# Patient Record
Sex: MALE | Race: WHITE
[De-identification: names, ages, dates, MRNs, and addresses within clinical notes are randomized per-mention and may not be internally consistent; named-entity substitution may affect disease eponyms.]

---

## 2021-08-14 ENCOUNTER — HOSPITAL ENCOUNTER (EMERGENCY)
Dept: HOSPITAL 49 - FER | Age: 76
Discharge: HOME | End: 2021-08-14
Payer: COMMERCIAL

## 2021-08-14 DIAGNOSIS — I10: ICD-10-CM

## 2021-08-14 DIAGNOSIS — I83.891: Primary | ICD-10-CM

## 2021-08-14 DIAGNOSIS — Z87.891: ICD-10-CM

## 2021-12-07 ENCOUNTER — HOSPITAL ENCOUNTER (INPATIENT)
Dept: HOSPITAL 49 - FER | Age: 76
LOS: 1 days | Discharge: OTHER INSTITUTION | DRG: 281 | End: 2021-12-08
Attending: INTERNAL MEDICINE | Admitting: INTERNAL MEDICINE
Payer: COMMERCIAL

## 2021-12-07 VITALS — BODY MASS INDEX: 34.42 KG/M2 | WEIGHT: 251.33 LBS | HEIGHT: 71.5 IN

## 2021-12-07 DIAGNOSIS — Z87.891: ICD-10-CM

## 2021-12-07 DIAGNOSIS — S60.212A: ICD-10-CM

## 2021-12-07 DIAGNOSIS — Z78.1: ICD-10-CM

## 2021-12-07 DIAGNOSIS — E11.65: ICD-10-CM

## 2021-12-07 DIAGNOSIS — I49.5: ICD-10-CM

## 2021-12-07 DIAGNOSIS — I10: ICD-10-CM

## 2021-12-07 DIAGNOSIS — I21.A1: ICD-10-CM

## 2021-12-07 DIAGNOSIS — Z79.01: ICD-10-CM

## 2021-12-07 DIAGNOSIS — E78.5: ICD-10-CM

## 2021-12-07 DIAGNOSIS — Z86.73: ICD-10-CM

## 2021-12-07 DIAGNOSIS — E66.9: ICD-10-CM

## 2021-12-07 DIAGNOSIS — Z79.899: ICD-10-CM

## 2021-12-07 DIAGNOSIS — S60.211A: ICD-10-CM

## 2021-12-07 DIAGNOSIS — I16.1: Primary | ICD-10-CM

## 2021-12-07 DIAGNOSIS — M54.50: ICD-10-CM

## 2021-12-07 DIAGNOSIS — Z95.5: ICD-10-CM

## 2021-12-07 DIAGNOSIS — Z20.822: ICD-10-CM

## 2021-12-07 DIAGNOSIS — Z98.890: ICD-10-CM

## 2021-12-07 DIAGNOSIS — Z79.82: ICD-10-CM

## 2021-12-07 DIAGNOSIS — I25.10: ICD-10-CM

## 2021-12-07 DIAGNOSIS — Z91.14: ICD-10-CM

## 2021-12-07 DIAGNOSIS — I67.4: ICD-10-CM

## 2021-12-07 LAB
ALBUMIN SERPL-MCNC: 3.9 G/DL (ref 3.4–5)
ALKALINE PHOSHATASE: 88 U/L (ref 46–116)
ALT SERPL-CCNC: 42 U/L (ref 16–63)
AST: 24 U/L (ref 15–37)
BACTERIA: (no result)
BASOPHIL: 0.5 % (ref 0–2)
BILIRUBIN - TOTAL: 0.4 MG/DL (ref 0.2–1)
BILIRUBIN: NEGATIVE MG/DL
BLOOD: (no result) ERY/UL
BUN SERPL-MCNC: 9 MG/DL (ref 7–18)
BUN/CREAT RATIO (CALC): 12.5 RATIO
CHLORIDE: 103 MMOL/L (ref 98–107)
CK MB SERPL-MCNC: 5.8 NG/ML (ref 0–3.6)
CLARITY UR: CLEAR
CO2 (BICARBONATE): 18 MMOL/L (ref 21–32)
COLOR: YELLOW
CORONAVIRUS 2019 SARS-COV-2: NEGATIVE
CREATININE: 0.72 MG/DL (ref 0.67–1.17)
EOSINOPHIL: 0.9 % (ref 0–7)
GLOBULIN (CALCULATION): 3.1 G/DL
GLUCOSE (U): NORMAL MG/DL
GLUCOSE SERPL-MCNC: 213 MG/DL (ref 74–106)
HCT: 42.8 % (ref 42–52)
HGB BLD-MCNC: 14.2 G/DL (ref 13.2–18)
INFLUENZA A NAA: NEGATIVE
LEUKOCYTES: NEGATIVE LEU/UL
LYMPHOCYTE: 16.3 % (ref 15–48)
MCH RBC QN AUTO: 29.4 PG (ref 25–31)
MCHC RBC AUTO-ENTMCNC: 33.2 G/DL (ref 32–36)
MCV: 88.6 FL (ref 78–100)
MONOCYTE: 5.3 % (ref 0–12)
MPV: 10.2 FL (ref 6–9.5)
MUCOUS: (no result)
NEUTROPHIL: 74.9 % (ref 41–80)
NITRITE: NEGATIVE MG/DL
NRBC: 0
PLT: 238 K/UL (ref 150–400)
POTASSIUM: 3.4 MMOL/L (ref 3.5–5.1)
PRO-BNP: 336 PG/ML (ref ?–125)
PROTEIN: (no result) MG/DL
RBC MORPHOLOGY: NORMAL
RBC: 4.83 M/UL (ref 4.7–6)
RDW: 12.7 % (ref 11.5–14)
SPECIFIC GRAVITY: 1.02 (ref 1–1.03)
TOTAL PROTEIN: 7 G/DL (ref 6.4–8.2)
URINARY RBC: (no result)
URINARY WBC: (no result)
UROBILINOGEN: 0.2 MG/DL (ref 0.2–1)
WBC: 14.1 K/UL (ref 4–10.5)

## 2021-12-07 PROCEDURE — G0378 HOSPITAL OBSERVATION PER HR: HCPCS

## 2021-12-07 PROCEDURE — U0002 COVID-19 LAB TEST NON-CDC: HCPCS

## 2021-12-08 LAB
ALBUMIN SERPL-MCNC: 3.1 G/DL (ref 3.4–5)
ALKALINE PHOSHATASE: 69 U/L (ref 46–116)
ALT SERPL-CCNC: 36 U/L (ref 16–63)
AST: 27 U/L (ref 15–37)
BASOPHIL: 0.2 % (ref 0–2)
BILIRUBIN - TOTAL: 0.5 MG/DL (ref 0.2–1)
BUN SERPL-MCNC: 12 MG/DL (ref 7–18)
BUN/CREAT RATIO (CALC): 15.2 RATIO
CHLORIDE: 105 MMOL/L (ref 98–107)
CO2 (BICARBONATE): 26 MMOL/L (ref 21–32)
CREATININE: 0.79 MG/DL (ref 0.67–1.17)
EOSINOPHIL: 0.9 % (ref 0–7)
GLOBULIN (CALCULATION): 2.7 G/DL
GLUCOSE SERPL-MCNC: 130 MG/DL (ref 74–106)
HCT: 35.9 % (ref 42–52)
HGB BLD-MCNC: 11.9 G/DL (ref 13.2–18)
LYMPHOCYTE: 11.2 % (ref 15–48)
MCH RBC QN AUTO: 29.7 PG (ref 25–31)
MCHC RBC AUTO-ENTMCNC: 33.1 G/DL (ref 32–36)
MCV: 89.5 FL (ref 78–100)
MONOCYTE: 9.9 % (ref 0–12)
MPV: 9.8 FL (ref 6–9.5)
NEUTROPHIL: 77.4 % (ref 41–80)
NRBC: 0
PLT: 191 K/UL (ref 150–400)
POTASSIUM: 3.7 MMOL/L (ref 3.5–5.1)
RBC MORPHOLOGY: NORMAL
RBC: 4.01 M/UL (ref 4.7–6)
RDW: 12.7 % (ref 11.5–14)
TOTAL PROTEIN: 5.8 G/DL (ref 6.4–8.2)
WBC: 11.7 K/UL (ref 4–10.5)

## 2021-12-08 NOTE — NUR
WAS GIVEN NORCO 5 AND 20 MG LISINOPRIL TO TREAT BACK PAIN AND HTN /78
BEFORE TRANSFER WITH EMS AT 1436. SEE EMAR.

## 2021-12-08 NOTE — NUR
REPORT GIVEN TO MADDI SUN AT [facility] AT 1254. PT IS GOING TO ROOM 815. EMS
ARRIVE FOR TRANSPORT AT 1450. PT STATED HE HAS ALL BELONGINGS WITH HIM.